# Patient Record
Sex: FEMALE | Race: WHITE | Employment: UNEMPLOYED | URBAN - METROPOLITAN AREA
[De-identification: names, ages, dates, MRNs, and addresses within clinical notes are randomized per-mention and may not be internally consistent; named-entity substitution may affect disease eponyms.]

---

## 2020-05-13 ENCOUNTER — TELEPHONE (OUTPATIENT)
Dept: OPHTHALMOLOGY | Facility: CLINIC | Age: 40
End: 2020-05-13

## 2020-05-13 NOTE — TELEPHONE ENCOUNTER
Left voicemail for patient.  She had expressed interest being seen sooner so offered appt 5/19.  Also made aware that there is a possibility that may have to change time on 5/26 due to limiting our times in clinic but at this time we can keep as scheduled if does not want to be seen on 5/19.      Marlene Robledo on 5/13/2020 at 1:40 PM

## 2020-05-26 ENCOUNTER — OFFICE VISIT (OUTPATIENT)
Dept: OPHTHALMOLOGY | Facility: CLINIC | Age: 40
End: 2020-05-26
Attending: OPHTHALMOLOGY
Payer: OTHER GOVERNMENT

## 2020-05-26 DIAGNOSIS — H47.20 PARTIAL OPTIC ATROPHY: Primary | ICD-10-CM

## 2020-05-26 DIAGNOSIS — H53.40 VISUAL FIELD DEFECT: ICD-10-CM

## 2020-05-26 DIAGNOSIS — H53.40 VISUAL FIELD DEFECT: Primary | ICD-10-CM

## 2020-05-26 PROBLEM — L25.9 CONTACT DERMATITIS AND OTHER ECZEMA: Status: ACTIVE | Noted: 2020-05-26

## 2020-05-26 PROBLEM — R56.9 SEIZURE (H): Status: ACTIVE | Noted: 2020-02-16

## 2020-05-26 PROBLEM — T50.905A DRUG OR MEDICINAL SUBSTANCE CAUSING ADVERSE EFFECT IN THERAPEUTIC USE: Status: ACTIVE | Noted: 2020-05-26

## 2020-05-26 PROBLEM — K08.9 DISORDER OF TEETH AND SUPPORTING STRUCTURES: Status: ACTIVE | Noted: 2020-05-26

## 2020-05-26 PROBLEM — M32.9: Status: ACTIVE | Noted: 2018-12-14

## 2020-05-26 PROBLEM — E44.1 MILD MALNUTRITION (H): Status: ACTIVE | Noted: 2019-09-29

## 2020-05-26 PROBLEM — I77.89: Status: ACTIVE | Noted: 2020-05-01

## 2020-05-26 PROBLEM — R44.3 HALLUCINATIONS: Status: ACTIVE | Noted: 2020-04-09

## 2020-05-26 PROBLEM — N30.80: Status: ACTIVE | Noted: 2020-05-26

## 2020-05-26 PROBLEM — O03.9 SPONTANEOUS ABORTION: Status: ACTIVE | Noted: 2020-05-26

## 2020-05-26 PROBLEM — R07.9 CHEST PAIN: Status: ACTIVE | Noted: 2020-05-26

## 2020-05-26 PROBLEM — O46.90 ANTEPARTUM HEMORRHAGE: Status: ACTIVE | Noted: 2020-05-26

## 2020-05-26 PROBLEM — F22 DELUSIONS (H): Status: ACTIVE | Noted: 2020-04-09

## 2020-05-26 PROBLEM — L30.8 DESQUAMATIVE DERMATITIS: Status: ACTIVE | Noted: 2018-12-14

## 2020-05-26 PROBLEM — I63.81 MULTIPLE LACUNAR INFARCTS (H): Status: ACTIVE | Noted: 2020-02-16

## 2020-05-26 PROBLEM — F17.200 TOBACCO USE DISORDER: Status: ACTIVE | Noted: 2020-05-26

## 2020-05-26 PROBLEM — R21 DISCOID RASH: Status: ACTIVE | Noted: 2018-12-14

## 2020-05-26 PROBLEM — G05.3: Status: ACTIVE | Noted: 2020-04-09

## 2020-05-26 PROBLEM — I10 ESSENTIAL HYPERTENSION: Status: ACTIVE | Noted: 2018-12-15

## 2020-05-26 PROBLEM — M32.19: Status: ACTIVE | Noted: 2020-04-09

## 2020-05-26 PROBLEM — F31.9 BIPOLAR 1 DISORDER (H): Status: ACTIVE | Noted: 2020-04-30

## 2020-05-26 PROBLEM — I63.9 CVA (CEREBRAL VASCULAR ACCIDENT) (H): Status: ACTIVE | Noted: 2018-03-17

## 2020-05-26 PROBLEM — M06.9 RHEUMATOID ARTHRITIS (H): Status: ACTIVE | Noted: 2020-05-26

## 2020-05-26 PROBLEM — N05.9 GLOMERULONEPHRITIS: Status: ACTIVE | Noted: 2020-05-26

## 2020-05-26 PROBLEM — I73.00 RAYNAUD'S SYNDROME: Status: ACTIVE | Noted: 2020-05-26

## 2020-05-26 PROBLEM — Z76.0 ENCOUNTER FOR ISSUE OF REPEAT PRESCRIPTION: Status: ACTIVE | Noted: 2020-05-26

## 2020-05-26 PROBLEM — J30.9 ALLERGIC RHINITIS: Status: ACTIVE | Noted: 2020-05-26

## 2020-05-26 PROBLEM — J32.0 CHRONIC MAXILLARY SINUSITIS: Status: ACTIVE | Noted: 2020-05-26

## 2020-05-26 PROBLEM — I61.9: Status: ACTIVE | Noted: 2020-02-16

## 2020-05-26 PROBLEM — M32.19: Status: ACTIVE | Noted: 2020-05-01

## 2020-05-26 PROCEDURE — 92133 CPTRZD OPH DX IMG PST SGM ON: CPT | Mod: ZF | Performed by: OPHTHALMOLOGY

## 2020-05-26 PROCEDURE — G0463 HOSPITAL OUTPT CLINIC VISIT: HCPCS | Mod: ZF

## 2020-05-26 PROCEDURE — 92083 EXTENDED VISUAL FIELD XM: CPT | Mod: ZF | Performed by: OPHTHALMOLOGY

## 2020-05-26 RX ORDER — ASPIRIN 81 MG/1
81 TABLET ORAL DAILY
COMMUNITY
Start: 2020-02-24

## 2020-05-26 RX ORDER — ARIPIPRAZOLE 10 MG/1
10 TABLET ORAL DAILY
COMMUNITY
Start: 2020-05-08

## 2020-05-26 RX ORDER — AMLODIPINE BESYLATE 5 MG/1
5 TABLET ORAL 2 TIMES DAILY
COMMUNITY
Start: 2020-03-24

## 2020-05-26 ASSESSMENT — CUP TO DISC RATIO
OS_RATIO: 0.2
OD_RATIO: 0.3

## 2020-05-26 ASSESSMENT — EXTERNAL EXAM - RIGHT EYE: OD_EXAM: NORMAL

## 2020-05-26 ASSESSMENT — VISUAL ACUITY
OS_SC: HM @ 2 FT
METHOD: SNELLEN - LINEAR
OD_SC: 20/25

## 2020-05-26 ASSESSMENT — CONF VISUAL FIELD
OS_INFERIOR_NASAL_RESTRICTION: 1
OS_SUPERIOR_NASAL_RESTRICTION: 2
OS_SUPERIOR_TEMPORAL_RESTRICTION: 2
OD_NORMAL: 1
OS_INFERIOR_TEMPORAL_RESTRICTION: 1

## 2020-05-26 ASSESSMENT — TONOMETRY
IOP_METHOD: ICARE
OS_IOP_MMHG: 7
OD_IOP_MMHG: 6

## 2020-05-26 ASSESSMENT — EXTERNAL EXAM - LEFT EYE: OS_EXAM: NORMAL

## 2020-05-26 ASSESSMENT — SLIT LAMP EXAM - LIDS
COMMENTS: NORMAL
COMMENTS: NORMAL

## 2020-05-26 NOTE — Clinical Note
"5/26/2020       RE: Blanca Sawyer  [address] [address]     Dear Colleague,    Thank you for referring your patient, Blanca Sawyer, to the EYE CLINIC at [facility]. Please see a copy of my visit note below.           Assessment & Plan     Blanca Sawyer is a 40 year old female with the following diagnoses:   1. Visual field defect       Blanca Sawyer is a 40 year old female referred by Dr. Sampson Mcpherson ([facility] in [place]) for optic nerve damage/disorder.    Longstanding history of SLE with periods of medication noncompliance per notes. Patient does not recall details of diagnosis and disease course. 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 Today, making repeat comments about \"holding eyelid open during testing.\"    Around 12/2019 noted decreased vision in the left eye involving the inferior visual field. Since that time notes progression of decreased vision and now involving central visual field. Denies eye pain, pain with eye movements, floaters or flashes of light. No involvement in the right eye. She weighed 119 lbs and lost 30 lbs over about several months.      Recent admission 02/2020 - SLE with libman sacks endocarditis, vasculitis, seizure, stroke. Had seizure are home and taken to ED. Found to have uncontrolled HTN. MR head/brain notable for multiple lacunar infarcts in the posterior centrum semiovale/corona radiata bilaterally and isolated microbleed in the central jennifer. CT angio head showed findings consistent with vasculopathy and given history of SLE suggested acute vasculitis. Treated with high-dose IV steroids x 5 days. Started on Also with cardiac vegetation on mitral valve noted during admission thought to be Libman-Sacks endocarditis. Per notes was complaining of decreased vision left eye, was seen by ophthalmology during admission " in February and noted to have bilateral evidence of hypertensive retinopathy with left optic nerve pallor. No follow-up visits with ophthalmology since that time.    MRI Brain w/ & w/o Contrast 4/29/2020  IMPRESSION:  1. Punctate subacute infarction within the posterior left temporal lobe.  2. Small chronic cortically based infarctions in the left occipital lobe are new compared to 02/16/2020.  3. Mild-to-moderate chronic microvascular ischemic changes with superimposed chronic lacunar infarctions in the bilateral centrum semiovale.   4. Stable chronic lacunar infarctions in the left cerebellar hemisphere.  5. Persistent opacification of the left maxillary sinus.    CTA Head 4/30/2020  IMPRESSION: Diffusely worsening cerebral vasculopathy.    POH: Negative  PMH: Lupus with hx of lupus vasculitis, HTN, rheumatoid arthritis, Raynaud's syndrome, CVA, glomerulonephritis, bipolar disorder, anxiety  FH: Negative for ocular condtions. Does not recall family history of autoimmune conditions  SH: Former smoker (quit 10+ years ago). No alcohol use  Meds: Abilify, prednisone, keppra, bactrim, trazodone, melatonin, plaquenil, seroquel, vitamin D, amlodipine, cardura, lisinopril, aspirin, cytoxan intravenous monthly    Visual acuity is 20/25 right eye and hand motion at two feet left eye. Intraocular pressure is 6 right eye and 7 left eye. Pupils with left afferent pupillary defect. Color plates 11/11 right eye and unable left eye. Confrontational visual fields normal right eye; central and inferior field deficit left eye. Motility normal. Slit lamp exam unremakrable. Dilated fundus exam ***    Visual fields right eye with peripheral constriction and MD -11.5; left eye with generalized depression and MD -27.8. OCT rNFL with diffuse thinning right eye with general thickness of 54; left eye unable to obtain scan.    It is my impression that she has a bilateral optic neuropathy LEFT eye >> RIGHT eye.The segmentation does not show  evidence of inner retinal loss and this would be consistent with a primary optic neuropathy.    I reviewed her MRI images personally. There was no fat saturated views and there was no clear evidence of enhancement on the April 2020 MRI.  Nutrition may have played a role with loss of ~25% of her body weight in the past several months. She is eating well now but we will check vitamin b12, folate, b1, copper, and magnesium levels.  Will also check treponema antibody and neuromyelitis optica IgG.  Would consider Wanda's Hereditary Optic Neuropathy (LHON) if develops worsening in the RIGHT eye.           Attending Physician Attestation:  Complete documentation of historical and exam elements from today's encounter can be found in the full encounter summary report (not reduplicated in this progress note).  I personally obtained the chief complaint(s) and history of present illness.  I confirmed and edited as necessary the review of systems, past medical/surgical history, family history, social history, and examination findings as documented by others; and I examined the patient myself.  I personally reviewed the relevant tests, images, and reports as documented above.  I formulated and edited as necessary the assessment and plan and discussed the findings and management plan with the patient and family. I personally reviewed the ophthalmic test(s) associated with this encounter, agree with the interpretation(s) as documented by the resident/fellow, and have edited the corresponding report(s) as necessary.  - Eleazar Treviño MD  Ophthalmology Resident, PGY-3      Again, thank you for allowing me to participate in the care of your patient.      Sincerely,    Eleazar Heller MD

## 2020-05-26 NOTE — PROGRESS NOTES
"       Assessment & Plan     Blanca Sawyer is a 40 year old female with the following diagnoses:   1. Partial optic atrophy    2. Visual field defect       Blanca Sawyer is a 40 year old female referred by Dr. Sampson Mcpherson ([facility] in [place]) for optic nerve damage/disorder.    Longstanding history of SLE with periods of medication noncompliance per notes. Patient does not recall details of diagnosis and disease course. 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 Today, making repeat comments about \"holding eyelid open during testing.\"    Around 12/2019 noted decreased vision in the left eye involving the inferior visual field. Since that time notes progression of decreased vision and now involving central visual field. Denies eye pain, pain with eye movements, floaters or flashes of light. No involvement in the right eye. She weighed 119 lbs and lost 30 lbs over about several months.      Recent admission 02/2020 - SLE with libman sacks endocarditis, vasculitis, seizure, stroke. Had seizure are home and taken to ED. Found to have uncontrolled HTN. MR head/brain notable for multiple lacunar infarcts in the posterior centrum semiovale/corona radiata bilaterally and isolated microbleed in the central jennifer. CT angio head showed findings consistent with vasculopathy and given history of SLE suggested acute vasculitis. Treated with high-dose IV steroids x 5 days. Started on Also with cardiac vegetation on mitral valve noted during admission thought to be Libman-Sacks endocarditis. Per notes was complaining of decreased vision left eye, was seen by ophthalmology during admission in February and noted to have bilateral evidence of hypertensive retinopathy with left optic nerve pallor. No follow-up visits with ophthalmology since that time.    MRI Brain w/ & w/o Contrast 4/29/2020  IMPRESSION:  1. Punctate subacute " infarction within the posterior left temporal lobe.  2. Small chronic cortically based infarctions in the left occipital lobe are new compared to 02/16/2020.  3. Mild-to-moderate chronic microvascular ischemic changes with superimposed chronic lacunar infarctions in the bilateral centrum semiovale.   4. Stable chronic lacunar infarctions in the left cerebellar hemisphere.  5. Persistent opacification of the left maxillary sinus.    CTA Head 4/30/2020  IMPRESSION: Diffusely worsening cerebral vasculopathy.    POH: Negative  PMH: Lupus with hx of lupus vasculitis, HTN, rheumatoid arthritis, Raynaud's syndrome, CVA, glomerulonephritis, bipolar disorder, anxiety  FH: Negative for ocular condtions. Does not recall family history of autoimmune conditions  SH: Former smoker (quit 10+ years ago). No alcohol use  Meds: Abilify, prednisone, keppra, bactrim, trazodone, melatonin, plaquenil, seroquel, vitamin D, amlodipine, cardura, lisinopril, aspirin, cytoxan intravenous monthly    Visual acuity is 20/25 right eye and hand motion at two feet left eye. Intraocular pressure is 6 right eye and 7 left eye. Pupils with left afferent pupillary defect. Color plates 11/11 right eye and unable left eye. Confrontational visual fields normal right eye; central and inferior field deficit left eye. Motility normal. Slit lamp exam unremakrable. Dilated fundus exam showed optic atrophy both eyes.      Visual fields right eye with peripheral constriction and MD -11.5; left eye with generalized depression and MD -27.8. OCT rNFL with diffuse thinning right eye with general thickness of 54; left eye unable to obtain scan.    It is my impression that she has a bilateral optic neuropathy LEFT eye >> RIGHT eye.The segmentation does not show evidence of inner retinal loss and this would be consistent with a primary optic neuropathy.    I reviewed her MRI images personally. There was no fat saturated views and there was no clear evidence of  enhancement on the April 2020 MRI.  Nutrition may have played a role with loss of ~25% of her body weight in the past several months. She is eating well now but we will check vitamin b12, folate, b1, copper, and magnesium levels.  Will also check treponema antibody and neuromyelitis optica IgG.  Would consider Wanda's Hereditary Optic Neuropathy (LHON) if develops worsening in the RIGHT eye. Follow up 3 months sooner as needed for worsening symptoms.            Attending Physician Attestation:  Complete documentation of historical and exam elements from today's encounter can be found in the full encounter summary report (not reduplicated in this progress note).  I personally obtained the chief complaint(s) and history of present illness.  I confirmed and edited as necessary the review of systems, past medical/surgical history, family history, social history, and examination findings as documented by others; and I examined the patient myself.  I personally reviewed the relevant tests, images, and reports as documented above.  I formulated and edited as necessary the assessment and plan and discussed the findings and management plan with the patient and family. I personally reviewed the ophthalmic test(s) associated with this encounter, agree with the interpretation(s) as documented by the resident/fellow, and have edited the corresponding report(s) as necessary.  - Eleazar Treviño MD  Ophthalmology Resident, PGY-3

## 2020-05-26 NOTE — LETTER
"2020     Sampson Mcpherson MD  [address] [address]  VIA Facsimile: [phone]       RE:  :  MRN: Blanca Sawyer  1980  [id number]     Dear Dr. Sampson Mcpherson,    Thank you for asking me to see your very pleasant patient, Blanca Sawyer, in neuro-ophthalmic consultation.  I would like to thank you for sending your records and I have summarized them in the history of present illness. She presented with her spouse by phone who provided additional history.  My assessment and plan are below.  For further details, please see my attached clinic note.        Assessment & Plan     Blanca Sawyer is a 40-year-old female with the following diagnoses:   1. Partial optic atrophy    2. Visual field defect       Blanca Sawyer is a 40-year-old female referred by Dr. Sampson Mcpherson ([facility] in [place]) for optic nerve damage/disorder.    Longstanding history of SLE with periods of medication noncompliance per notes. Patient does not recall details of diagnosis and disease course. 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 Today, making repeat comments about \"holding eyelid open during testing.\"    Around 2019 noted decreased vision in the left eye involving the inferior visual field. Since that time notes progression of decreased vision and now involving central visual field. Denies eye pain, pain with eye movements, floaters or flashes of light. No involvement in the right eye. She weighed 119 lbs and lost 30 lbs over about several months.      Recent admission 2020 - SLE with libman sacks endocarditis, vasculitis, seizure, stroke. Had seizure at home and taken to ED. Found to have uncontrolled HTN. MR head/brain notable for multiple lacunar infarcts in the posterior centrum semiovale/corona radiata bilaterally and isolated microbleed in the central jennifer. CT angio head showed " findings consistent with vasculopathy and given history of SLE suggested acute vasculitis. Treated with high-dose IV steroids x 5 days. Also with cardiac vegetation on mitral valve noted during admission thought to be Libman-Sacks endocarditis. Per notes, was complaining of decreased vision left eye, was seen by Ophthalmology during admission in February and noted to have bilateral evidence of hypertensive retinopathy with left optic nerve pallor. No follow-up visits with Ophthalmology since that time.    MRI Brain w/ & w/o Contrast 4/29/2020  IMPRESSION:  1. Punctate subacute infarction within the posterior left temporal lobe.  2. Small chronic cortically based infarctions in the left occipital lobe are new compared to 02/16/2020.  3. Mild-to-moderate chronic microvascular ischemic changes with superimposed chronic lacunar infarctions in the bilateral centrum semiovale.   4. Stable chronic lacunar infarctions in the left cerebellar hemisphere.  5. Persistent opacification of the left maxillary sinus.    CTA Head 4/30/2020  IMPRESSION: Diffusely worsening cerebral vasculopathy.    POH: Negative  PMH: Lupus with hx of lupus vasculitis, HTN, rheumatoid arthritis, Raynaud's syndrome, CVA, glomerulonephritis, bipolar disorder, anxiety  FH: Negative for ocular condtions. Does not recall family history of autoimmune conditions  SH: Former smoker (quit 10+ years ago). No alcohol use  Meds: Abilify, prednisone, keppra, bactrim, trazodone, melatonin, plaquenil, seroquel, vitamin D, amlodipine, cardura, lisinopril, aspirin, cytoxan intravenous monthly    Visual acuity is 20/25 right eye and hand motion at two feet left eye. Intraocular pressure is 6 right eye and 7 left eye. Pupils with left afferent pupillary defect. Color plates 11/11 right eye and unable left eye. Confrontational visual fields normal right eye; central and inferior field deficit left eye. Motility normal. Slit lamp exam unremakrable. Dilated fundus exam  showed optic atrophy both eyes.      Visual fields right eye with peripheral constriction and MD -11.5; left eye with generalized depression and MD -27.8. OCT rNFL with diffuse thinning right eye with general thickness of 54; left eye unable to obtain scan.    It is my impression that she has a bilateral optic neuropathy LEFT eye >> RIGHT eye.The segmentation does not show evidence of inner retinal loss and this would be consistent with a primary optic neuropathy.   I reviewed her MRI images personally. There was no fat saturated views and there was no clear evidence of enhancement on the April 2020 MRI.  Nutrition may have played a role with loss of ~25% of her body weight in the past several months. She is eating well now but we will check vitamin b12, folate, b1, copper, and magnesium levels.  Will also check treponema antibody and neuromyelitis optica IgG.  Would consider Wanda's Hereditary Optic Neuropathy (LHON) if develops worsening in the RIGHT eye.         Again, thank you for allowing me to participate in the care of your patient.      Sincerely,    Eleazar Heller MD  Professor  Ophthalmology Residency   Director of Neuro-Ophthalmology  [employer] Endowed Chair  Departments of Ophthalmology, Neurology, and Neurosurgery  [facility] [address] [place]  [address]  T - [phone]  F - [phone]  CASIE [email or web address]

## 2020-05-26 NOTE — NURSING NOTE
Chief Complaints and History of Present Illnesses   Patient presents with     Blurred Vision Evaluation     Chief Complaint(s) and History of Present Illness(es)     Blurred Vision Evaluation               Comments     Blanca Sawyer is a 40 year old female who presents today for  Vision changes, left eye  Onset December 2019 along with abnormal limb movements and hypertensive emergency. CTA brain was consistent with vasculopathy and elevated inflamatory markers were consistent with vasculitis. No improvement in vision since onset. H/o CNS lupus.   Orin URRUTIA 9:41 AM May 26, 2020

## 2020-05-29 ENCOUNTER — TRANSFERRED RECORDS (OUTPATIENT)
Dept: HEALTH INFORMATION MANAGEMENT | Facility: CLINIC | Age: 40
End: 2020-05-29

## 2020-06-10 ENCOUNTER — TELEPHONE (OUTPATIENT)
Dept: OPHTHALMOLOGY | Facility: CLINIC | Age: 40
End: 2020-06-10

## 2020-06-10 NOTE — TELEPHONE ENCOUNTER
Called patient to discuss recent labwork results. Reviewed with patient that NMO testing was negative. Vitamin B12 and MMA in normal range. Homocysteine mildly high which can indicate folate deficiency. Patient to follow-up with PCP regarding folate supplement.     Patient requesting that I talk with her  about results. Called  (Blayne) to review labwork. All questions answered. Patient and  to call with any worsening of symptoms, otherwise will follow-up in 3 months as planned.    Sudheer Treviño MD  Ophthalmology Resident, PGY-3

## 2020-08-13 ENCOUNTER — TRANSFERRED RECORDS (OUTPATIENT)
Dept: HEALTH INFORMATION MANAGEMENT | Facility: CLINIC | Age: 40
End: 2020-08-13

## 2020-08-13 ENCOUNTER — OFFICE VISIT (OUTPATIENT)
Dept: OPHTHALMOLOGY | Facility: CLINIC | Age: 40
End: 2020-08-13
Attending: OPHTHALMOLOGY
Payer: OTHER GOVERNMENT

## 2020-08-13 DIAGNOSIS — H53.40 VISUAL FIELD DEFECT: ICD-10-CM

## 2020-08-13 DIAGNOSIS — H47.20 PARTIAL OPTIC ATROPHY: ICD-10-CM

## 2020-08-13 DIAGNOSIS — H47.20 OPTIC ATROPHY: Primary | ICD-10-CM

## 2020-08-13 DIAGNOSIS — H53.40 VISUAL FIELD DEFECT: Primary | ICD-10-CM

## 2020-08-13 PROCEDURE — 92083 EXTENDED VISUAL FIELD XM: CPT | Mod: ZF | Performed by: OPHTHALMOLOGY

## 2020-08-13 PROCEDURE — G0463 HOSPITAL OUTPT CLINIC VISIT: HCPCS | Mod: ZF | Performed by: TECHNICIAN/TECHNOLOGIST

## 2020-08-13 PROCEDURE — 92133 CPTRZD OPH DX IMG PST SGM ON: CPT | Mod: ZF | Performed by: OPHTHALMOLOGY

## 2020-08-13 ASSESSMENT — SLIT LAMP EXAM - LIDS
COMMENTS: NORMAL
COMMENTS: NORMAL

## 2020-08-13 ASSESSMENT — VISUAL ACUITY
CORRECTION_TYPE: GLASSES
OD_CC: 20/25
METHOD: SNELLEN - LINEAR
OD_CC+: +1
OS_CC: 3/200

## 2020-08-13 ASSESSMENT — REFRACTION_WEARINGRX
SPECS_TYPE: SVL
OD_SPHERE: -1.00
OD_CYLINDER: SPHERE
OS_SPHERE: PLANO

## 2020-08-13 ASSESSMENT — EXTERNAL EXAM - LEFT EYE: OS_EXAM: NORMAL

## 2020-08-13 ASSESSMENT — CONF VISUAL FIELD
OS_SUPERIOR_NASAL_RESTRICTION: 1
OS_INFERIOR_NASAL_RESTRICTION: 1
OS_INFERIOR_TEMPORAL_RESTRICTION: 1
OD_NORMAL: 1
OS_SUPERIOR_TEMPORAL_RESTRICTION: 2

## 2020-08-13 ASSESSMENT — TONOMETRY
OS_IOP_MMHG: 13
IOP_METHOD: ICARE
OD_IOP_MMHG: 14

## 2020-08-13 ASSESSMENT — EXTERNAL EXAM - RIGHT EYE: OD_EXAM: NORMAL

## 2020-08-13 ASSESSMENT — CUP TO DISC RATIO
OD_RATIO: 0.2
OS_RATIO: 0.3

## 2020-08-13 NOTE — LETTER
2020         RE:  :  MRN: Blanca Sawyer  1980  [id number]     Dear Dr. Mcpherson,    Your patient, Blanca Sawyer, returned for neuro-ophthalmic follow up. My assessment and plan are below.  For further details, please see my attached clinic note.      Assessment & Plan     Blanca Sawyer is a 40 year old female with the following diagnoses:   1. Optic atrophy - Both Eyes    2. Visual field defect    3. Partial optic atrophy        Patient is here for 3 month follow up of optic atrophy left greater than right.  Last visit was 2020.  At that time I felt it was possibly due to nutrition with loss of ~25% of her body weight in the months leading up to the vision loss.  Vitamin B12 and MMA in normal range, homocystine was mildly elevated and recommended patient discuss with her PCP regarding folate supplementation.  Feels vision has gotten worse since last visit.      Visual acuity 20/25 RIGHT eye and 3/200 left eye (improved).  Anterior segment exam normal both eyes.  Fundus exam with diffuse pallor and attenuated vessels both eyes.  Visual field improved RIGHT eye and stable both eyes.  Optical coherence tomography diffuse loss both eyes.      It is my impression that patient has bilateral optic atrophy both eyes most likely due to her nutritional status with loss of approximatley 25% of her body weight.  Her visual field in the right eye is slightly better than last visit.  Her visual acuity in the left eye is definitely better.  I will obtain treponema since this was not done since her last visit to rule that out as a cause.  Follow up in 6 months or sooner as needed for worsening.          Again, thank you for allowing me to participate in the care of your patient.      Sincerely,    Eleazar Heller MD  Professor  Ophthalmology Residency   Director of Neuro-Ophthalmology  [employer] Endowed Chair  Departments of Ophthalmology, Neurology, and Neurosurgery  [facility]  [facility] [address] [place]  [address]  T - [phone]  F - [phone]  CASIE - [email or web address]

## 2020-08-13 NOTE — Clinical Note
8/13/2020       RE: Blanca Sawyer  [address] [facility] [address]     Dear Colleague,    Thank you for referring your patient, Blanca Sawyer, to the EYE CLINIC at [facility]. Please see a copy of my visit note below.           Assessment & Plan     Blanca Sawyer is a 40 year old female with the following diagnoses:   1. Optic atrophy - Both Eyes    2. Visual field defect    3. Partial optic atrophy        Patient is here for 3 month follow up of optic atrophy left greater than right.  Last visit was 5/2020.  At that time I felt it was possibly due to nutrition with loss of ~25% of her body weight in the months leading up to the vision loss.  Vitamin B12 and MMA in normal range, homocystine was mildly elevated and recommended patient discuss with her PCP regarding folate supplementation.  Feels vision has gotten worse since last visit.      Visual acuity 20/25 RIGHT eye and 3/200 left eye (improved).  Anterior segment exam normal both eyes.  Fundus exam with diffuse pallor and attenuated vessels both eyes.  Visual field improved RIGHT eye and stable both eyes.  Optical coherence tomography diffuse loss both eyes.      It is my impression that patient has bilateral optic atrophy both eyes most likely due to her nutritional status with loss of approximatley 25% of her body weight.  Her visual field in the right eye is slightly better than last visit.  Her visual acuity in the left eye is definitely better.  I will obtain treponema since this was not done since her last visit to rule that out as a cause.  Follow up in 6 months or sooner as needed for worsening.           Attending Physician Attestation:  Complete documentation of historical and exam elements from today's encounter can be found in the full encounter summary report (not reduplicated in this progress note).  I personally obtained the chief complaint(s) and history of present illness.  I confirmed and  edited as necessary the review of systems, past medical/surgical history, family history, social history, and examination findings as documented by others; and I examined the patient myself.  I personally reviewed the relevant tests, images, and reports as documented above.  I formulated and edited as necessary the assessment and plan and discussed the findings and management plan with the patient and family. - Eleazar Heller MD      Again, thank you for allowing me to participate in the care of your patient.      Sincerely,    Eleazar Heller MD

## 2020-08-13 NOTE — NURSING NOTE
Chief Complaint(s) and History of Present Illness(es)     Follow Up     In both eyes (optic neuropathy LEFT eye >> RIGHT eye).  Since onset it is gradually worsening.              Comments     Patient states vision each eye is gradually getting worse. Reports both central and peripheral visual fields are blurry each eye.   +headaches and eye pain with sunlight.     GHADA Collins 8/13/2020 8:13 AM

## 2020-08-13 NOTE — PROGRESS NOTES
Assessment & Plan     Blanca Sawyer is a 40 year old female with the following diagnoses:   1. Optic atrophy - Both Eyes    2. Visual field defect    3. Partial optic atrophy        Patient is here for 3 month follow up of optic atrophy left greater than right.  Last visit was 5/2020.  At that time I felt it was possibly due to nutrition with loss of ~25% of her body weight in the months leading up to the vision loss.  Vitamin B12 and MMA in normal range, homocystine was mildly elevated and recommended patient discuss with her PCP regarding folate supplementation.  Feels vision has gotten worse since last visit.      Visual acuity 20/25 RIGHT eye and 3/200 left eye (improved).  Anterior segment exam normal both eyes.  Fundus exam with diffuse pallor and attenuated vessels both eyes.  Visual field improved RIGHT eye and stable both eyes.  Optical coherence tomography diffuse loss both eyes.      It is my impression that patient has bilateral optic atrophy both eyes most likely due to her nutritional status with loss of approximatley 25% of her body weight.  Her visual field in the right eye is slightly better than last visit.  Her visual acuity in the left eye is definitely better.  I will obtain treponema since this was not done since her last visit to rule that out as a cause.  Follow up in 6 months or sooner as needed for worsening.           Attending Physician Attestation:  Complete documentation of historical and exam elements from today's encounter can be found in the full encounter summary report (not reduplicated in this progress note).  I personally obtained the chief complaint(s) and history of present illness.  I confirmed and edited as necessary the review of systems, past medical/surgical history, family history, social history, and examination findings as documented by others; and I examined the patient myself.  I personally reviewed the relevant tests, images, and reports as documented above.   I formulated and edited as necessary the assessment and plan and discussed the findings and management plan with the patient and family. - Eleazar Heller MD

## 2020-08-17 ENCOUNTER — TELEPHONE (OUTPATIENT)
Dept: OPHTHALMOLOGY | Facility: CLINIC | Age: 40
End: 2020-08-17

## 2020-08-17 NOTE — TELEPHONE ENCOUNTER
Spoke to patient about results of treponema lab being unremarkable.  Patient should follow up as scheduled in February or sooner as needed for worsening symptoms.      Marlene Robledo on 8/17/2020 at 4:43 PM

## 2020-11-14 ENCOUNTER — HEALTH MAINTENANCE LETTER (OUTPATIENT)
Age: 40
End: 2020-11-14

## 2021-02-16 DIAGNOSIS — H53.40 VISUAL FIELD DEFECT: Primary | ICD-10-CM

## 2021-05-21 ENCOUNTER — TRANSFERRED RECORDS (OUTPATIENT)
Dept: HEALTH INFORMATION MANAGEMENT | Facility: CLINIC | Age: 41
End: 2021-05-21
Payer: OTHER GOVERNMENT

## 2021-05-21 LAB — HEP C HIM: NORMAL

## 2021-05-24 ENCOUNTER — TRANSFERRED RECORDS (OUTPATIENT)
Dept: HEALTH INFORMATION MANAGEMENT | Facility: CLINIC | Age: 41
End: 2021-05-24
Payer: OTHER GOVERNMENT

## 2021-05-27 ENCOUNTER — TRANSFERRED RECORDS (OUTPATIENT)
Dept: HEALTH INFORMATION MANAGEMENT | Facility: CLINIC | Age: 41
End: 2021-05-27
Payer: OTHER GOVERNMENT

## 2021-06-10 ENCOUNTER — TRANSFERRED RECORDS (OUTPATIENT)
Dept: HEALTH INFORMATION MANAGEMENT | Facility: CLINIC | Age: 41
End: 2021-06-10
Payer: OTHER GOVERNMENT

## 2021-06-22 ENCOUNTER — TRANSFERRED RECORDS (OUTPATIENT)
Dept: HEALTH INFORMATION MANAGEMENT | Facility: CLINIC | Age: 41
End: 2021-06-22
Payer: OTHER GOVERNMENT

## 2021-07-06 ENCOUNTER — TRANSFERRED RECORDS (OUTPATIENT)
Dept: HEALTH INFORMATION MANAGEMENT | Facility: CLINIC | Age: 41
End: 2021-07-06
Payer: OTHER GOVERNMENT

## 2021-09-12 ENCOUNTER — HEALTH MAINTENANCE LETTER (OUTPATIENT)
Age: 41
End: 2021-09-12

## 2021-10-11 ENCOUNTER — TRANSFERRED RECORDS (OUTPATIENT)
Dept: HEALTH INFORMATION MANAGEMENT | Facility: CLINIC | Age: 41
End: 2021-10-11
Payer: OTHER GOVERNMENT

## 2021-11-12 ENCOUNTER — TRANSFERRED RECORDS (OUTPATIENT)
Dept: HEALTH INFORMATION MANAGEMENT | Facility: CLINIC | Age: 41
End: 2021-11-12
Payer: OTHER GOVERNMENT

## 2021-11-23 ENCOUNTER — MEDICAL CORRESPONDENCE (OUTPATIENT)
Dept: HEALTH INFORMATION MANAGEMENT | Facility: CLINIC | Age: 41
End: 2021-11-23
Payer: OTHER GOVERNMENT

## 2021-11-24 ENCOUNTER — TRANSFERRED RECORDS (OUTPATIENT)
Dept: HEALTH INFORMATION MANAGEMENT | Facility: CLINIC | Age: 41
End: 2021-11-24
Payer: OTHER GOVERNMENT

## 2021-11-24 ENCOUNTER — REFERRAL (OUTPATIENT)
Dept: TRANSPLANT | Facility: CLINIC | Age: 41
End: 2021-11-24

## 2021-11-24 DIAGNOSIS — K83.01 PSC (PRIMARY SCLEROSING CHOLANGITIS) (H): Primary | ICD-10-CM

## 2021-11-24 LAB — INR (EXTERNAL): 1.2

## 2021-11-24 NOTE — LETTER
October 31, 2022    Blanca Sawyer  [address] [address]    Dear Ms. Sawyer,   The purpose of this letter is to let you know that the [facility] Liver Transplant Team reviewed your transplant referral. Based on chart review, it appears that you have decided to discontinue your pursuit of liver transplant at this time. As such, your referral to the liver transplant program has been closed. Please feel free to reach out to program in the future if you would like to begin the transplant evaluation progess.  Important things you should know:    If you would like to discuss the decision, or if your medical status changes you may schedule a return visits with your doctor by calling [phone] and asking to speak to your transplant coordinator.    We recommend that you continue to follow up with your primary care doctor in order to manage your health concerns.  Enclosed is a letter from [employer] which describes the services offered to patients by [facility] and the Organ Procurement and Transplantation Network.  Thank you for allowing us to participate in your care.  We wish you well.  Sincerely,  Marc Paige RN  Pre-Liver Transplant Coordinator  [phone] (direct)  [phone] (fax)        Solid Organ Transplant  [facility]    Enclosures: OS Letter  cc: Care Team      The Organ Procurement and Transplantation Network  Toll-free patient services line:     Your resource for organ transplant information    If you have a question regarding your own medical care, you always should call your transplant hospital first. However, for general organ transplant-related information, you can call the Organ Procurement and Transplantation Network (OPTN) toll-free patient services line at [phone]- [phone]. Anyone, including potential transplant candidates, candidates, recipients, family members, friends, living  donors, and donor family members, can call this number to:          Talk about organ donation, living donation, the transplant process, the donation process, and transplant policies.    Get a free patient information kit with helpful booklets, waiting list and transplant information, and a list of all transplant hospitals.    Ask questions about the OPTN website (https://optn.transplant.hrsa.gov/), the [employer] for Organ Sharing s (UNOS) website (https://unos.org/), or the UNOS website for living donors and transplant recipients. (https://www.transplantliving.org/).    Learn how the OPTN can help you.    Talk about any concerns that you may have with a transplant hospital.    The [facility] transplant system, the OPTN, is managed under federal contract by the [employer] for Organ Sharing (UNOS), which is a non-profit charitable organization. The OPTN helps create and define organ sharing policies that make the best use of donated organs. This process continuously evaluating new advances and discoveries so policies can be adapted to best serve patients waiting for transplants. To do so, the OPTN works closely with transplant professionals, transplant patients, transplant candidates, donor families, living donors, and the public. All transplant programs and organ procurement organizations throughout the country are OPTN members and are obligated to follow the policies the OPTN creates for allocating organs.    The OPTN also is responsible for:      Providing educational material for patients, the public, and professionals.    Raising awareness of the need for donated organs and tissue.    Coordinating organ procurement, matching, and placement.    Collecting information about every organ transplant and donation that occurs in the [place].    Remember, you should contact your transplant hospital directly if you have questions or concerns about your own medical care including medical records, work-up  progress, and test results.    We are not your transplant hospital, and our staff will not be able to answer questions about your case, so please keep your transplant hospital s phone number handy.    However, while you research your transplant needs and learn as much as you can about transplantation and donation, we welcome your call to our toll-free patient services line at [phone].          Updated 4/1/2019

## 2021-11-24 NOTE — LETTER
12/1/2021    Blanca Sawyer  [address] [address]      Dear Blanca,    Thank you for your interest in the Transplant Center at [facility]. We look forward to being a part of your care team and assisting you through the transplant process.    As we discussed, your transplant coordinator is Raul Arguello Jr. (Liver).  You may call your coordinator at any time with questions or concerns call [phone].    Please complete the following.    1. Fill out and return the enclosed forms    Authorization for Electronic Communication    Authorization to Discuss Protected Health Information    Authorization for Release of Protected Health Information    Authorization for Care Everywhere Release of Information    2. Sign up for:    [place], access to your electronic medical record (see enclosed pamphlet)    [email or web address], a transplant education website    You can use these tools to learn more about your transplant, communicate with your care team, and track your medical details      Sincerely,  Solid Organ Transplant  [facility]    cc: Referring Physician and PCP

## 2021-11-30 VITALS — BODY MASS INDEX: 20.2 KG/M2 | HEIGHT: 61 IN | WEIGHT: 107 LBS

## 2021-11-30 LAB
ALT SERPL-CCNC: 154 IU/L (ref 8–45)
AST SERPL-CCNC: 166 IU/L (ref 2–40)
CREATININE (EXTERNAL): 0.89 MG/DL (ref 0.57–1.11)
GFR ESTIMATED (EXTERNAL): >60 ML/MIN/1.73M2
GFR ESTIMATED (IF AFRICAN AMERICAN) (EXTERNAL): >60 ML/MIN/1.73M2
GLUCOSE (EXTERNAL): 92 MG/DL (ref 65–100)
POTASSIUM (EXTERNAL): 4.5 MMOL/L (ref 3.5–5)

## 2021-11-30 ASSESSMENT — MIFFLIN-ST. JEOR: SCORE: 1092.73

## 2021-11-30 NOTE — TELEPHONE ENCOUNTER
Patient was asked the following questions during liver intake call.     Referring Provider: Dr. Goran Rubio  Referring Diagnosis: PSC  PCP: Dr. Sampson Mcpherson     1)Do you know why you have liver disease: Yes       If Alcoholic Cirrhosis is present when was your last drink: NA       Have you ever been through treatment for alcohol: No  2) Presence of Ascites: No Paracentesis: No  3) Presence of Hepatic Encephalopathy: No Medications: No  4) History of GI Bleeding: Yes  5) Oxygen Use: None  6) EGD: No, but has procedure scheduled for January 2022.  7) Colonoscopy: No, but has procedure scheduled for January 2022.  8) MELD Score: 15  9) Labs available for review from PCP/GI: Yes  10) HCC Diagnosis: No  11)Insurance information: [employer]      Policy hernandez: Self      Subscriber/policy/ID number: Self       Group Number: [id number]     Referral intake process completed.  Patient is aware that after financial approval is received, medical records will be requested.   Patient confirmed for a callback from transplant coordinator on 12/6/2021.  Tentative evaluation date TBD.    Confirmed coordinator will discuss evaluation process in more detail at the time of their call.   Patient is aware of the need to arrange age appropriate cancer screening, vaccinations, and dental care.  Reminded patient to complete questionnaire, complete medical records release, and review packet prior to evaluation visit .  Assessed patient for special needs (ie--wheelchair, assistance, guardian, and ):  No  Patient instructed to call [phone] with questions.     Patient gave verbal consent during intake call to obtain medical records and documents outside of [facility]:  Yes     IAN Chaves, LPN       Solid Organ Transplant

## 2021-12-02 ENCOUNTER — DOCUMENTATION ONLY (OUTPATIENT)
Dept: TRANSPLANT | Facility: CLINIC | Age: 41
End: 2021-12-02

## 2021-12-06 ENCOUNTER — TELEPHONE (OUTPATIENT)
Dept: TRANSPLANT | Facility: CLINIC | Age: 41
End: 2021-12-06
Payer: OTHER GOVERNMENT

## 2021-12-06 NOTE — TELEPHONE ENCOUNTER
"  Referral initial call  MELD: 15  Blood Type:   PCP: Dr. Goran Rubio  Referring: Dr. Sampson Mcpherson    Social Hx: from [place].   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  Education:   Family/social support:  for 15 years,  was 21 years [employer]. One 13 yo child. Both parents living. \"Not comfortable discussing personal information\".    Liver DX/History:  Dx: PSC. Was told of liver disease in last year. Was told cirrhosis of liver.  HE/Meds: No, No.  Ascites (Paracentesis/diuretics?): No, No, No medications.  GIB/dark stools: No  Colonoscopy/EGD: No [place], has one scheduled for January. No EGD.   Alcohol use (chem dep done?): Unable to Assess    Medical hx: had lupus since 12 years old.  Neuro (strokes/seizures?): 2 seizures, takes Keppra   Cardiac (MI s, ECHOs/Angios?): HTN. Has had ECHO s.   Lungs/smoking (oxygen use?): Get s SOB, works with PT/OT. Smoked for 10 years, quit 12 years ago.  Kidneys: Had bladder issues/continence problems.  Cancer: None  Vaccines (Hepatitis/COVID?): none. Patient asked if COVID vaccine was required for transplant, this RN stated that it is a expectation but not required for the evaluation. Pt/S.O. very upset, ended phone call early.  Surgeries (abdominal?): Had kidney/liver biopsies.   Diabetes: None    Plan:  Patient consents to accessing of their health records from OSH: Unable to Assess. Patient verbalized not wanting to continue with evaluation due to COVID vaccination requirement for surgery. This RN gave patient phone number for call back with questions/concerns.  "

## 2022-01-02 ENCOUNTER — HEALTH MAINTENANCE LETTER (OUTPATIENT)
Age: 42
End: 2022-01-02

## 2022-11-09 NOTE — TELEPHONE ENCOUNTER
November 8, 2022 6:27 PM - Marc Paige RN:     Per chart review, patient's liver symptoms resolved, current MELD score yet, has decided with her prior established medical team to discontinue pursuit of liver transplant.

## 2022-11-19 ENCOUNTER — HEALTH MAINTENANCE LETTER (OUTPATIENT)
Age: 42
End: 2022-11-19

## 2023-04-09 ENCOUNTER — HEALTH MAINTENANCE LETTER (OUTPATIENT)
Age: 43
End: 2023-04-09

## 2024-04-06 ENCOUNTER — HEALTH MAINTENANCE LETTER (OUTPATIENT)
Age: 44
End: 2024-04-06

## 2024-06-15 ENCOUNTER — HEALTH MAINTENANCE LETTER (OUTPATIENT)
Age: 44
End: 2024-06-15

## 2025-07-12 ENCOUNTER — MEDICAL CORRESPONDENCE (OUTPATIENT)
Dept: HEALTH INFORMATION MANAGEMENT | Facility: CLINIC | Age: 45
End: 2025-07-12
Payer: COMMERCIAL

## 2025-07-17 ENCOUNTER — TRANSCRIBE ORDERS (OUTPATIENT)
Dept: OTHER | Age: 45
End: 2025-07-17

## 2025-07-17 DIAGNOSIS — M79.18 LEFT BUTTOCK PAIN: ICD-10-CM

## 2025-07-17 DIAGNOSIS — G89.29 CHRONIC LEFT-SIDED LOW BACK PAIN WITH LEFT-SIDED SCIATICA: ICD-10-CM

## 2025-07-17 DIAGNOSIS — N31.9 NEUROGENIC BLADDER: ICD-10-CM

## 2025-07-17 DIAGNOSIS — R29.898 WEAKNESS OF LEFT LEG: ICD-10-CM

## 2025-07-17 DIAGNOSIS — M51.06 LUMBAR DISC HERNIATION WITH MYELOPATHY: Primary | ICD-10-CM

## 2025-07-17 DIAGNOSIS — M32.14 OTHER SYSTEMIC LUPUS ERYTHEMATOSUS WITH GLOMERULAR DISEASE (H): ICD-10-CM

## 2025-07-17 DIAGNOSIS — M54.42 CHRONIC LEFT-SIDED LOW BACK PAIN WITH LEFT-SIDED SCIATICA: ICD-10-CM

## 2025-07-17 DIAGNOSIS — R15.9 INCONTINENCE OF FECES, UNSPECIFIED FECAL INCONTINENCE TYPE: ICD-10-CM

## 2025-07-17 DIAGNOSIS — M54.17 LUMBOSACRAL RADICULOPATHY: ICD-10-CM

## 2025-07-22 ENCOUNTER — PATIENT OUTREACH (OUTPATIENT)
Dept: CARE COORDINATION | Facility: CLINIC | Age: 45
End: 2025-07-22
Payer: COMMERCIAL

## 2025-07-24 ENCOUNTER — PATIENT OUTREACH (OUTPATIENT)
Dept: CARE COORDINATION | Facility: CLINIC | Age: 45
End: 2025-07-24
Payer: COMMERCIAL